# Patient Record
Sex: MALE | Race: WHITE
[De-identification: names, ages, dates, MRNs, and addresses within clinical notes are randomized per-mention and may not be internally consistent; named-entity substitution may affect disease eponyms.]

---

## 2017-01-12 ENCOUNTER — TRANSCRIPTION ENCOUNTER (OUTPATIENT)
Age: 32
End: 2017-01-12

## 2017-07-09 ENCOUNTER — EMERGENCY (EMERGENCY)
Facility: HOSPITAL | Age: 32
LOS: 0 days | Discharge: HOME | End: 2017-07-09
Attending: EMERGENCY MEDICINE | Admitting: EMERGENCY MEDICINE
Payer: SELF-PAY

## 2017-07-09 VITALS — WEIGHT: 203.05 LBS | HEIGHT: 66 IN

## 2017-07-09 VITALS
HEART RATE: 69 BPM | RESPIRATION RATE: 20 BRPM | OXYGEN SATURATION: 98 % | DIASTOLIC BLOOD PRESSURE: 67 MMHG | SYSTOLIC BLOOD PRESSURE: 132 MMHG

## 2017-07-09 DIAGNOSIS — M51.26 OTHER INTERVERTEBRAL DISC DISPLACEMENT, LUMBAR REGION: ICD-10-CM

## 2017-07-09 DIAGNOSIS — M54.9 DORSALGIA, UNSPECIFIED: ICD-10-CM

## 2017-07-09 DIAGNOSIS — M51.26 OTHER INTERVERTEBRAL DISC DISPLACEMENT, LUMBAR REGION: Chronic | ICD-10-CM

## 2017-07-09 DIAGNOSIS — M47.27 OTHER SPONDYLOSIS WITH RADICULOPATHY, LUMBOSACRAL REGION: ICD-10-CM

## 2017-07-09 DIAGNOSIS — G89.29 OTHER CHRONIC PAIN: ICD-10-CM

## 2017-07-09 PROCEDURE — 72100 X-RAY EXAM L-S SPINE 2/3 VWS: CPT | Mod: 26

## 2017-07-09 PROCEDURE — 99053 MED SERV 10PM-8AM 24 HR FAC: CPT

## 2017-07-09 PROCEDURE — 99284 EMERGENCY DEPT VISIT MOD MDM: CPT

## 2017-07-09 RX ORDER — OXYCODONE AND ACETAMINOPHEN 5; 325 MG/1; MG/1
2 TABLET ORAL ONCE
Qty: 0 | Refills: 0 | Status: DISCONTINUED | OUTPATIENT
Start: 2017-07-09 | End: 2017-07-09

## 2017-07-09 RX ORDER — DIAZEPAM 5 MG
1 TABLET ORAL
Qty: 8 | Refills: 0 | OUTPATIENT
Start: 2017-07-09

## 2017-07-09 RX ORDER — MORPHINE SULFATE 50 MG/1
6 CAPSULE, EXTENDED RELEASE ORAL ONCE
Qty: 0 | Refills: 0 | Status: DISCONTINUED | OUTPATIENT
Start: 2017-07-09 | End: 2017-07-09

## 2017-07-09 RX ORDER — KETOROLAC TROMETHAMINE 30 MG/ML
30 SYRINGE (ML) INJECTION ONCE
Qty: 0 | Refills: 0 | Status: DISCONTINUED | OUTPATIENT
Start: 2017-07-09 | End: 2017-07-09

## 2017-07-09 RX ADMIN — Medication 125 MILLIGRAM(S): at 21:49

## 2017-07-09 RX ADMIN — OXYCODONE AND ACETAMINOPHEN 2 TABLET(S): 5; 325 TABLET ORAL at 22:07

## 2017-07-09 RX ADMIN — Medication 30 MILLIGRAM(S): at 21:49

## 2017-07-09 NOTE — ED STATDOCS - OBJECTIVE STATEMENT
33 y/o M with HX of herniated lumbar disk  presents to ED for evaluation of back pain. Pt states he has chronic back pain that was worsened when he stood up and felt a pop followed by pain. Pt states he is being followed by an orthopedist who is currently recommending surgery. Pt has taken ibuprofen PTA. PCP is at [facility]. No incontinence

## 2017-07-09 NOTE — ED ADULT NURSE NOTE - OBJECTIVE STATEMENT
pt is 31 yo male presents to er for chronic back pain, hx of herniated disc, back surgery, knee surgery getting worse since fourth of July.

## 2017-07-09 NOTE — ED ADULT NURSE NOTE - CHPI ED SYMPTOMS NEG
no anorexia/no neck tenderness/no difficulty bearing weight/no fatigue/no bladder dysfunction/no motor function loss

## 2017-07-09 NOTE — ED ADULT TRIAGE NOTE - CHIEF COMPLAINT QUOTE
chronic back pain. Reports "stood up" on 7/4 and "felt pop. Has not been seen. has not tried any OTC meds. Reports "chronic back issues."

## 2017-07-09 NOTE — ED STATDOCS - PROGRESS NOTE DETAILS
JG:  Pt. brought computer disk copy of MRI from 5/5/17 without associated report. Patient states his foot drop has been going on since Sept. 2016 and is a workers comp case so he is frustrated because the earliest MRI he was able to get was in May 2017.  Pt. to see Dr. Landaverde from [facility] on 7/21.  Will manage patients acute pain issues with ibuprofen, medrol dosepak, percocet and valium at this time. Pt. feeling better and wants to go home.  Will rest for the next 2-3 days and see Dr. Landaverde for outpatient management.

## 2017-07-09 NOTE — ED STATDOCS - NEUROLOGICAL, MLM
sensation is normal and strength is normal. sensation is normal and strength is normal except chronic left foot drop with 4/5 strength with left foot dorsiflexion

## 2018-01-14 ENCOUNTER — TRANSCRIPTION ENCOUNTER (OUTPATIENT)
Age: 33
End: 2018-01-14

## 2018-04-30 ENCOUNTER — EMERGENCY (EMERGENCY)
Facility: HOSPITAL | Age: 33
LOS: 0 days | Discharge: HOME | End: 2018-04-30
Attending: EMERGENCY MEDICINE | Admitting: EMERGENCY MEDICINE
Payer: OTHER MISCELLANEOUS

## 2018-04-30 VITALS
OXYGEN SATURATION: 99 % | DIASTOLIC BLOOD PRESSURE: 89 MMHG | HEART RATE: 82 BPM | SYSTOLIC BLOOD PRESSURE: 162 MMHG | RESPIRATION RATE: 18 BRPM | TEMPERATURE: 98 F

## 2018-04-30 VITALS — WEIGHT: 203.93 LBS
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
## 2018-04-30 DIAGNOSIS — M54.5 LOW BACK PAIN: ICD-10-CM
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
## 2018-04-30 DIAGNOSIS — S93.401A SPRAIN OF UNSPECIFIED LIGAMENT OF RIGHT ANKLE, INITIAL ENCOUNTER: ICD-10-CM

## 2018-04-30 DIAGNOSIS — M51.26 OTHER INTERVERTEBRAL DISC DISPLACEMENT, LUMBAR REGION: Chronic | ICD-10-CM

## 2018-04-30 DIAGNOSIS — S89.81XA OTHER SPECIFIED INJURIES OF RIGHT LOWER LEG, INITIAL ENCOUNTER: ICD-10-CM

## 2018-04-30 PROCEDURE — 72100 X-RAY EXAM L-S SPINE 2/3 VWS: CPT | Mod: 26

## 2018-04-30 PROCEDURE — 29515 APPLICATION SHORT LEG SPLINT: CPT | Mod: RT

## 2018-04-30 PROCEDURE — 99284 EMERGENCY DEPT VISIT MOD MDM: CPT

## 2018-04-30 PROCEDURE — 73610 X-RAY EXAM OF ANKLE: CPT | Mod: 26,RT

## 2018-04-30 NOTE — ED ADULT TRIAGE NOTE - CHIEF COMPLAINT QUOTE
Pt states he was at work and ahd mechnical trip and fall, falling onto back  and left ankle. pt denies hitting head, no LOC, no anticoagulation therapy,. pt c/o back pain and right ankle pain (depsite landing on left)

## 2018-04-30 NOTE — ED STATDOCS - MUSCULOSKELETAL, MLM
+tenderness in L-S spine with palpation, both midline and laterally. Negative SLR b/l. Right ankle pain with flexion, no bony tenderness.

## 2018-04-30 NOTE — ED PROCEDURE NOTE - CPROC ED POST PROC CARE GUIDE1
Instructed patient/caregiver to follow-up with primary care physician./Elevate the injured extremity as instructed./Keep the cast/splint/dressing clean and dry./Verbal/written post procedure instructions were given to patient/caregiver./Instructed patient/caregiver regarding signs and symptoms of infection.

## 2018-04-30 NOTE — ED STATDOCS - PROGRESS NOTE DETAILS
Patient seen and evaluated.  No acute findings on xrays.  R ankle likely sprained, placed in ace wrap and air cast, RICE reviewed and orthopedic f/u recommended.  Return precautions for back injury reviewed, patient will follow up with his spine specialist -Sean Reyez PA-C

## 2018-04-30 NOTE — ED STATDOCS - CARE PLAN
Principal Discharge DX:	Fall, initial encounter  Secondary Diagnosis:	Ankle sprain  Secondary Diagnosis:	Low back pain

## 2018-04-30 NOTE — ED STATDOCS - OBJECTIVE STATEMENT
31 y/o male with a PMHx of chronic back pain s/p surgery presents to the ED s/p mechanical fall 30 minutes ago. Pt states he was at work and slipped and fell on his back and right ankle. Pt now c/o lower back pain, right ankle pain. No fever or any other acute complaints at this time. NKDA. Takes Ibuprofen.

## 2020-02-05 NOTE — ED STATDOCS - CONSTITUTIONAL NEGATIVE STATEMENT, MLM
For redness to the upper and lower eyelids:    Hydrocortisone 1% cream:  Apply a thin layer to affected skin 3 times daily for 7 days. If the rash improves but then returns shortly after stopping the Hydrocortisone cream, it would suggest allergy to dog may be the reason for the redness. If the redness doesn't improve at all, then a referral to Pediatric Dermatology is recommended. ..... Pediatric Dermatologists:    Dr. Christelle Turner. [facility] [address]  [phone] (Dermatology Clinic)     Dr. Lea Jasso, [address] [phone]    Dr. Caroline Cuevas [facility] on Tuesdays and Thursday, [facility], [address] [facility] [phone] [facility], [address] [place] [address] [address] [phone] on Fridays.
no fever and no chills.

## 2020-10-23 NOTE — ED ADULT NURSE NOTE - NS ED PATIENT SAFETY CONCERN
Health Maintenance Due   Topic Date Due   • Shingles Vaccine (2 of 3) 11/09/2011   • Medicare Wellness Visit  10/22/2020     Here for MWV-sub  Patient is due for topics as listed above but is not proceeding with Immunization(s) Shingles at this time.   Due for Shingrix  Due for MWV    Recent PHQ 2/9 Score    PHQ 2:  Date Adult PHQ 2 Score Adult PHQ 2 Interpretation   10/23/2020 0 No further screening needed       PHQ 9:        3.) During the past 4 weeks, how would you rate your health?: Fair     6 a.) How many servings of Fruits and Vegetables do you have each day ( 1 serving = 1 piece of fruit, 1/2 cup fruits or vegetables): 1 per day     8.) During the past 4 weeks, has your physical and emotional health limited your social activities with family, friends, neighbors, or other groups?: Quite a bit     11a.) Bladder Control problems (urine leaking): Often     11b.) Bowel control problems: Often     11f.) Feeling stressed or overwhelmed: Often     11j.) Problems with your balance: Often     13.) Do you need help with any of the following activities?: Go shopping for groceries or clothes, Do your housework or laundry, Prepare a meal, Handle your own money     15.) How confident are you that you can control and manage most of your health problems?: Not very confident          No

## 2021-06-03 ENCOUNTER — EMERGENCY (EMERGENCY)
Facility: HOSPITAL | Age: 36
LOS: 0 days | Discharge: HOME | End: 2021-06-03
Attending: EMERGENCY MEDICINE
Payer: COMMERCIAL

## 2021-06-03 VITALS
SYSTOLIC BLOOD PRESSURE: 136 MMHG | DIASTOLIC BLOOD PRESSURE: 84 MMHG | TEMPERATURE: 98 F | RESPIRATION RATE: 18 BRPM | HEART RATE: 74 BPM | OXYGEN SATURATION: 98 %

## 2021-06-03 VITALS — HEIGHT: 66 IN | WEIGHT: 199.96 LBS

## 2021-06-03 DIAGNOSIS — G89.29 OTHER CHRONIC PAIN: ICD-10-CM

## 2021-06-03 DIAGNOSIS — M51.26 OTHER INTERVERTEBRAL DISC DISPLACEMENT, LUMBAR REGION: Chronic | ICD-10-CM

## 2021-06-03 DIAGNOSIS — M54.9 DORSALGIA, UNSPECIFIED: ICD-10-CM

## 2021-06-03 DIAGNOSIS — M25.521 PAIN IN RIGHT ELBOW: ICD-10-CM

## 2021-06-03 DIAGNOSIS — M77.11 LATERAL EPICONDYLITIS, RIGHT ELBOW: ICD-10-CM
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
## 2021-06-03 PROCEDURE — 73080 X-RAY EXAM OF ELBOW: CPT | Mod: RT

## 2021-06-03 PROCEDURE — 99283 EMERGENCY DEPT VISIT LOW MDM: CPT | Mod: 25

## 2021-06-03 PROCEDURE — 73080 X-RAY EXAM OF ELBOW: CPT | Mod: 26,RT

## 2021-06-03 PROCEDURE — 99284 EMERGENCY DEPT VISIT MOD MDM: CPT

## 2021-06-03 NOTE — ED ADULT NURSE NOTE - NS ED PATIENT SAFETY CONCERN
Immediate Operative Summary


Operative Date


Jan 18, 2017.





Pre-Operative Diagnosis





Left eye cataract





Post-Operative Diagnosis





Same as preop





Procedure(s) Performed





Left Cataract Phacoemulsification With Intraocular Lens Implant; Restor


Lens


Femto Laser





Surgeon


Dr. Mathis





Assistant Surgeon(s)


None





Estimated Blood Loss


0 mL





Findings





Nuclear cataract left eye





Fluids (cc crystalloids)


300 ml





Specimens





None





Drains


none





Anesthesia


L/S





Complication(s)


None





Disposition


Recovery Room / PACU No

## 2021-06-03 NOTE — ED STATDOCS - PROGRESS NOTE DETAILS
36 yr. old male presents to ED with right elbow pain for the past month. 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 No fever or chills. No apparent injury. Seen and examined by attending in intake. Plan: X-ray of elbow and forearm. Pain control. Will F/U with results and re evaluate. Francisco J NGUYEN Right Elbow and Right forearm X-rays no fracture. Francisco J NP Results of X-rays reviewed with patient. Agreed to take Ibuprofen 600 mg. PO every 6 hrs. with food and F/u with Orthopedic. Francisco J NGUYEN

## 2021-06-03 NOTE — ED STATDOCS - CARE PROVIDER_API CALL
Marium Mars)  Orthopaedic Surgery; Surgery of the Hand  [address] [place] [address]  Phone: [phone]  Fax: [phone]  Follow Up Time:

## 2021-06-03 NOTE — ED STATDOCS - NSFOLLOWUPINSTRUCTIONS_ED_ALL_ED_FT
Tennis Elbow    WHAT YOU NEED TO KNOW:    Tennis elbow is inflammation of the tendons in your elbow. Tendons are strong tissues that connect muscle to bone.     DISCHARGE INSTRUCTIONS:    Return to the emergency department if:   •You suddenly have no feeling in your arm, hand, or fingers.      •You suddenly cannot move your arm, wrist, hand, or fingers.      Contact your healthcare provider if:   •You have a fever.      •You have more pain or weakness in your arm, wrist, hand, or fingers.      •You have new numbness or tingling in your arm, hand, or fingers.      •You have questions or concerns about your condition or care.      Medicines:   •Acetaminophen decreases pain and fever. It is available without a doctor's order. Ask how much to take and how often to take it. Follow directions. Read the labels of all other medicines you are using to see if they also contain acetaminophen, or ask your doctor or pharmacist. Acetaminophen can cause liver damage if not taken correctly. Do not use more than 4 grams (4,000 milligrams) total of acetaminophen in one day.       •NSAIDs, such as ibuprofen, help decrease swelling, pain, and fever. This medicine is available with or without a doctor's order. NSAIDs can cause stomach bleeding or kidney problems in certain people. If you take blood thinner medicine, always ask your healthcare provider if NSAIDs are safe for you. Always read the medicine label and follow directions.      •Take your medicine as directed. Contact your healthcare provider if you think your medicine is not helping or if you have side effects. Tell him or her if you are allergic to any medicine. Keep a list of the medicines, vitamins, and herbs you take. Include the amounts, and when and why you take them. Bring the list or the pill bottles to follow-up visits. Carry your medicine list with you in case of an emergency.      Physical therapy: A physical therapist teaches you exercises to help improve movement and strength, and to decrease pain.     Self-care:   •Wear your support device as directed. Devices, such as an arm strap, brace, or splint, help limit your arm movement. They also decrease pain and help prevent more damage to your tendon. Ask your healthcare provider how to care for your arm while you wear a brace or splint.      •Rest your injured arm and avoid activities that cause pain. This will help your tendons heal.      •Apply ice on your elbow for 15 to 20 minutes every hour or as directed. Use an ice pack, or put crushed ice in a plastic bag. Cover it with a towel before you apply it to your skin. Ice helps prevent tissue damage and decreases swelling and pain.      •Elevate your elbow above the level of your heart as often as you can. This will help decrease swelling and pain. Prop your elbow on pillows or blankets to keep it elevated comfortably.       Follow up with your healthcare provider as directed: Write down your questions so you remember to ask them during your visits.

## 2021-06-03 NOTE — ED STATDOCS - OBJECTIVE STATEMENT
35 y/o M with PMHx of chronic back pain presents ambulatory to the ED c/o +R elbow pain x1 month s/p changing tires at work. Has been icing, taking OTC meds, and compression with some improvement but still with pain. No fever or other injuries. NKDA.

## 2021-06-03 NOTE — ED ADULT NURSE NOTE - NSIMPLEMENTINTERV_GEN_ALL_ED
Implemented All Universal Safety Interventions:  [place] to call system. Call bell, personal items and telephone within reach. Instruct patient to call for assistance. Room bathroom lighting operational. Non-slip footwear when patient is off stretcher. Physically safe environment: no spills, clutter or unnecessary equipment. Stretcher in lowest position, wheels locked, appropriate side rails in place.

## 2021-06-03 NOTE — ED STATDOCS - PATIENT PORTAL LINK FT
You can access the FollowMyHealth Patient Portal offered by [facility] by registering at the following website: [email or web address]. By joining [employer]’s FollowMyHealth portal, you will also be able to view your health information using other applications (apps) compatible with our system.

## 2021-06-04 PROBLEM — M51.26 OTHER INTERVERTEBRAL DISC DISPLACEMENT, LUMBAR REGION: Chronic | Status: ACTIVE | Noted: 2017-07-09

## 2021-06-04 PROBLEM — M54.9 DORSALGIA, UNSPECIFIED: Chronic | Status: ACTIVE | Noted: 2017-07-09

## 2022-09-11 ENCOUNTER — NON-APPOINTMENT (OUTPATIENT)
Age: 37
End: 2022-09-11

## 2023-07-18 NOTE — ED ADULT NURSE NOTE - RESPIRATORY WDL
[Initial] : an initial consultation for
Breathing spontaneous and unlabored. Breath sounds clear and equal bilaterally with regular rhythm.

## 2024-07-29 ENCOUNTER — EMERGENCY (EMERGENCY)
Facility: HOSPITAL | Age: 39
LOS: 0 days | Discharge: HOME | End: 2024-07-29
Attending: STUDENT IN AN ORGANIZED HEALTH CARE EDUCATION/TRAINING PROGRAM
Payer: COMMERCIAL

## 2024-07-29 VITALS
HEART RATE: 74 BPM | SYSTOLIC BLOOD PRESSURE: 136 MMHG | TEMPERATURE: 99 F | OXYGEN SATURATION: 98 % | RESPIRATION RATE: 16 BRPM | DIASTOLIC BLOOD PRESSURE: 99 MMHG

## 2024-07-29 VITALS — WEIGHT: 211.2 LBS

## 2024-07-29 DIAGNOSIS — K08.89 OTHER SPECIFIED DISORDERS OF TEETH AND SUPPORTING STRUCTURES: ICD-10-CM

## 2024-07-29 DIAGNOSIS — K04.7 PERIAPICAL ABSCESS WITHOUT SINUS: ICD-10-CM

## 2024-07-29 DIAGNOSIS — M51.26 OTHER INTERVERTEBRAL DISC DISPLACEMENT, LUMBAR REGION: Chronic | ICD-10-CM

## 2024-07-29 LAB
ALBUMIN SERPL ELPH-MCNC: 3.7 G/DL — SIGNIFICANT CHANGE UP (ref 3.3–5)
ALP SERPL-CCNC: 121 U/L — HIGH (ref 40–120)
ALT FLD-CCNC: 20 U/L — SIGNIFICANT CHANGE UP (ref 12–78)
ANION GAP SERPL CALC-SCNC: 4 MMOL/L — LOW (ref 5–17)
AST SERPL-CCNC: 18 U/L — SIGNIFICANT CHANGE UP (ref 15–37)
BASOPHILS # BLD AUTO: 0.07 K/UL — SIGNIFICANT CHANGE UP (ref 0–0.2)
BASOPHILS NFR BLD AUTO: 0.6 % — SIGNIFICANT CHANGE UP (ref 0–2)
BILIRUB SERPL-MCNC: 0.5 MG/DL — SIGNIFICANT CHANGE UP (ref 0.2–1.2)
BUN SERPL-MCNC: 11 MG/DL — SIGNIFICANT CHANGE UP (ref 7–23)
CALCIUM SERPL-MCNC: 9.2 MG/DL — SIGNIFICANT CHANGE UP (ref 8.5–10.1)
CHLORIDE SERPL-SCNC: 107 MMOL/L — SIGNIFICANT CHANGE UP (ref 96–108)
CO2 SERPL-SCNC: 25 MMOL/L — SIGNIFICANT CHANGE UP (ref 22–31)
CREAT SERPL-MCNC: 0.79 MG/DL — SIGNIFICANT CHANGE UP (ref 0.5–1.3)
EGFR: 117 ML/MIN/1.73M2 — SIGNIFICANT CHANGE UP
EOSINOPHIL # BLD AUTO: 0.08 K/UL — SIGNIFICANT CHANGE UP (ref 0–0.5)
EOSINOPHIL NFR BLD AUTO: 0.6 % — SIGNIFICANT CHANGE UP (ref 0–6)
GLUCOSE SERPL-MCNC: 128 MG/DL — HIGH (ref 70–99)
HCT VFR BLD CALC: 39.8 % — SIGNIFICANT CHANGE UP (ref 39–50)
HGB BLD-MCNC: 13.3 G/DL — SIGNIFICANT CHANGE UP (ref 13–17)
IMM GRANULOCYTES NFR BLD AUTO: 0.5 % — SIGNIFICANT CHANGE UP (ref 0–0.9)
LACTATE SERPL-SCNC: 0.8 MMOL/L — SIGNIFICANT CHANGE UP (ref 0.7–2)
LIDOCAIN IGE QN: 14 U/L — SIGNIFICANT CHANGE UP (ref 13–75)
LYMPHOCYTES # BLD AUTO: 1.58 K/UL — SIGNIFICANT CHANGE UP (ref 1–3.3)
LYMPHOCYTES # BLD AUTO: 12.4 % — LOW (ref 13–44)
MCHC RBC-ENTMCNC: 30.5 PG — SIGNIFICANT CHANGE UP (ref 27–34)
MCHC RBC-ENTMCNC: 33.4 GM/DL — SIGNIFICANT CHANGE UP (ref 32–36)
MCV RBC AUTO: 91.3 FL — SIGNIFICANT CHANGE UP (ref 80–100)
MONOCYTES # BLD AUTO: 0.76 K/UL — SIGNIFICANT CHANGE UP (ref 0–0.9)
MONOCYTES NFR BLD AUTO: 6 % — SIGNIFICANT CHANGE UP (ref 2–14)
NEUTROPHILS # BLD AUTO: 10.17 K/UL — HIGH (ref 1.8–7.4)
NEUTROPHILS NFR BLD AUTO: 79.9 % — HIGH (ref 43–77)
PLATELET # BLD AUTO: 166 K/UL — SIGNIFICANT CHANGE UP (ref 150–400)
POTASSIUM SERPL-MCNC: 4 MMOL/L — SIGNIFICANT CHANGE UP (ref 3.5–5.3)
POTASSIUM SERPL-SCNC: 4 MMOL/L — SIGNIFICANT CHANGE UP (ref 3.5–5.3)
PROT SERPL-MCNC: 7.6 GM/DL — SIGNIFICANT CHANGE UP (ref 6–8.3)
RBC # BLD: 4.36 M/UL — SIGNIFICANT CHANGE UP (ref 4.2–5.8)
RBC # FLD: 12.2 % — SIGNIFICANT CHANGE UP (ref 10.3–14.5)
SODIUM SERPL-SCNC: 136 MMOL/L — SIGNIFICANT CHANGE UP (ref 135–145)
WBC # BLD: 12.72 K/UL — HIGH (ref 3.8–10.5)
WBC # FLD AUTO: 12.72 K/UL — HIGH (ref 3.8–10.5)

## 2024-07-29 PROCEDURE — 99285 EMERGENCY DEPT VISIT HI MDM: CPT

## 2024-07-29 PROCEDURE — 99284 EMERGENCY DEPT VISIT MOD MDM: CPT | Mod: 25

## 2024-07-29 PROCEDURE — 36415 COLL VENOUS BLD VENIPUNCTURE: CPT

## 2024-07-29 PROCEDURE — 96375 TX/PRO/DX INJ NEW DRUG ADDON: CPT | Mod: XU

## 2024-07-29 PROCEDURE — 70487 CT MAXILLOFACIAL W/DYE: CPT | Mod: 26,MC

## 2024-07-29 PROCEDURE — 87040 BLOOD CULTURE FOR BACTERIA: CPT

## 2024-07-29 PROCEDURE — 80053 COMPREHEN METABOLIC PANEL: CPT

## 2024-07-29 PROCEDURE — 83605 ASSAY OF LACTIC ACID: CPT

## 2024-07-29 PROCEDURE — 96374 THER/PROPH/DIAG INJ IV PUSH: CPT | Mod: XU

## 2024-07-29 PROCEDURE — 85025 COMPLETE CBC W/AUTO DIFF WBC: CPT

## 2024-07-29 PROCEDURE — 83690 ASSAY OF LIPASE: CPT

## 2024-07-29 PROCEDURE — 70487 CT MAXILLOFACIAL W/DYE: CPT | Mod: MC

## 2024-07-29 RX ORDER — SODIUM CHLORIDE 0.9 % (FLUSH) 0.9 %
1000 SYRINGE (ML) INJECTION ONCE
Refills: 0 | Status: COMPLETED | OUTPATIENT
Start: 2024-07-29 | End: 2024-07-29

## 2024-07-29 RX ORDER — KETOROLAC TROMETHAMINE 30 MG/ML
15 INJECTION, SOLUTION INTRAMUSCULAR ONCE
Refills: 0 | Status: DISCONTINUED | OUTPATIENT
Start: 2024-07-29 | End: 2024-07-29

## 2024-07-29 RX ORDER — AMOXICILLIN/POTASSIUM CLAV 250-125 MG
875 TABLET ORAL
Qty: 28 | Refills: 0
Start: 2024-07-29 | End: 2024-08-11

## 2024-07-29 RX ORDER — PIPERACILLIN SODIUM AND TAZOBACTAM SODIUM 3; .375 G/15ML; G/15ML
3.38 INJECTION, POWDER, LYOPHILIZED, FOR SOLUTION INTRAVENOUS ONCE
Refills: 0 | Status: COMPLETED | OUTPATIENT
Start: 2024-07-29 | End: 2024-07-29

## 2024-07-29 RX ORDER — OXYCODONE HYDROCHLORIDE 100 MG/5ML
1 SOLUTION ORAL
Qty: 9 | Refills: 0
Start: 2024-07-29 | End: 2024-07-31

## 2024-07-29 RX ORDER — ACETAMINOPHEN 325 MG
2 TABLET ORAL
Qty: 112 | Refills: 0
Start: 2024-07-29 | End: 2024-08-11

## 2024-07-29 RX ADMIN — KETOROLAC TROMETHAMINE 15 MILLIGRAM(S): 30 INJECTION, SOLUTION INTRAMUSCULAR at 10:19

## 2024-07-29 RX ADMIN — Medication 1000 MILLILITER(S): at 08:09

## 2024-07-29 RX ADMIN — Medication 1000 MILLILITER(S): at 10:20

## 2024-07-29 RX ADMIN — PIPERACILLIN SODIUM AND TAZOBACTAM SODIUM 200 GRAM(S): 3; .375 INJECTION, POWDER, LYOPHILIZED, FOR SOLUTION INTRAVENOUS at 10:36

## 2024-07-29 NOTE — ED PROVIDER NOTE - NSFOLLOWUPCLINICS_GEN_ALL_ED_FT
Oral & Maxillofacial Surgery  Department of Dental Medicine  [address] [place] [address]  Phone: [phone]  Fax: [phone]

## 2024-07-29 NOTE — ED PROVIDER NOTE - PATIENT PORTAL LINK FT
You can access the FollowMyHealth Patient Portal offered by [facility] by registering at the following website: [email or web address]. By joining [employer]’s FollowMyHealth portal, you will also be able to view your health information using other applications (apps) compatible with our system.

## 2024-07-29 NOTE — ED PROVIDER NOTE - PROGRESS NOTE DETAILS
Daniele Attending Physician:  CT maxillofacial showed periapical abscess in the left maxillary first molar with associated buccal cortical dehiscence and adjacent subperiosteal abscess. Consulted Dr. Pollo REDDY and recommends that patient can either follow up outpatient at the [facility] dental clinic or be transferred to [facility] ED for dental evaluation. Discussed with patient and wife at bedside and patient would like to go home now and will follow up with [facility] dental clinic on his own. Return precautions reviewed and provided information for [facility] dental clinic.

## 2024-07-29 NOTE — ED PROVIDER NOTE - OBJECTIVE STATEMENT
Patient is a 39 year-old-male with no reported PMH/PSH presents with 3-day history of L dental pain. Accompanied by significant other. Reports that he started having pain in the L upper teeth and noticed swelling in the left face. Denies fever at home. Reports that he took ibuprofen and oxycodone at home with some relief this morning. Does not follow with a dentist.

## 2024-07-29 NOTE — ED ADULT TRIAGE NOTE - CHIEF COMPLAINT QUOTE
pt ambulatory to triage c/o left facial swelling/pain since Friday, increasing jaw pain since Friday, swelling extends from orbital to jaw line, no SOB.

## 2024-07-29 NOTE — ED PROVIDER NOTE - PHYSICAL EXAMINATION
Vitals: I have reviewed the patients vital signs  General: not in acute distress   HEENT: Atraumatic, normocephalic, airway patent, L facial swelling with tenderness noted on palpation, poor dental hygiene, tenderness/fluctuance noted in the gum area surrounding teeth # 14-16   Eyes: EOMI, tracking appropriately  Neck: no tracheal deviation, no JVD  Chest/Lungs: symmetric chest rise, speaking in complete sentences, no WOB  Heart: skin and extremities well perfused, borderline tachycardia   Abdomen: soft, nontender and nondistended   Neuro: A+Ox3, ambulating without difficulty, CN grossly intact  MSK: strength at baseline in all extremities, no muscle wasting or atrophy  Skin: no cyanosis, no jaundice, no new emergent lesions

## 2024-07-29 NOTE — ED ADULT NURSE NOTE - OBJECTIVE STATEMENT
pt c/o dental pain. pt states he woke up to go to work this morning and left side of face was swollen and painful. No recent dental procedures, left side of face swollen from jaw to cheek. pt took 2 amoxicillin and 30mg oxy PTA. Pt denies fevers, n/v/d, CP, SOB, HA/dizziness, blurry vision at this time

## 2024-07-29 NOTE — ED PROVIDER NOTE - NS ED MD DISPO DISCHARGE CCDA
WFL except left knee ~ 5-75 degrees. BUEs and BLEs grossly 3+/5 within available range. Patient/Caregiver provided printed discharge information.

## 2024-07-29 NOTE — ED PROVIDER NOTE - NSFOLLOWUPINSTRUCTIONS_ED_ALL_ED_FT
You were seen in the emergency department for left dental pain, found to have abscess. You can find the results of all the tests in this discharge packet.     Please follow up with your primary care doctor within 48 hours for continuation of care. Please follow up with [facility] dental clinic for further management of left dental abscess.     Return to the emergency department if you experience any new/concerning/worsening symptoms such as but not limited to: fever (>100.3F), intractable nausea, vomiting, chest pain, shortness of breath, abdominal pain, worsening pain not controlled with medication at home.     For pain, you may take:  1) Acetaminophen (Tylenol): 500mg every 6 hours as needed for pain   2) Ibuprofen (Advil/Motrin): 400mg every 6 hours as needed for pain   3) Oxycodone: 5mg every 8 hours as needed for severe pain *Do not drive or operate heavy machinery while taking this medication*  4) Augmentin: 1 tablet every 12 hours for 14 days You were seen in the emergency department for left dental pain, found to have abscess. You can find the results of all the tests in this discharge packet.     Please follow up with your primary care doctor within 48 hours for continuation of care. Please follow up with [facility] dental clinic for further management of left dental abscess.     Return to the emergency department if you experience any new/concerning/worsening symptoms such as but not limited to: fever (>100.3F), intractable nausea, vomiting, chest pain, shortness of breath, abdominal pain, worsening pain not controlled with medication at home.     You are prescribed:  1) Acetaminophen (Tylenol): 500mg every 6 hours as needed for pain   2) Ibuprofen (Advil/Motrin): 400mg every 6 hours as needed for pain   3) Oxycodone: 5mg every 8 hours as needed for severe pain *Do not drive or operate heavy machinery while taking this medication*  4) Augmentin: 1 tablet every 12 hours for 14 days

## 2024-07-29 NOTE — ED PROVIDER NOTE - CLINICAL SUMMARY MEDICAL DECISION MAKING FREE TEXT BOX
Patient is a 39 year-old-male with no reported PMH/PSH presents with 3-day history of L dental pain. Accompanied by significant other. Reports that he started having pain in the L upper teeth and noticed swelling in the left face. Denies fever at home. Reports that he took ibuprofen and oxycodone at home with some relief this morning. Does not follow with a dentist. Exam concerning for dental infection vs abscess. Workup includes labs and CT maxillofacial to evaluate for abscess. Will give IV hydration and reassess. Patient is declining pain medication at this time.

## 2024-07-29 NOTE — ED ADULT NURSE REASSESSMENT NOTE - NS ED NURSE REASSESS COMMENT FT1
received report from SVEN Collier.  patient resting in stretcher in lowest locked position.  IV placed, laws drawn.  transported to [place] at 08:00.  pending results, patient and visitor aware

## 2024-07-29 NOTE — ED ADULT NURSE NOTE - NSFALLUNIVINTERV_ED_ALL_ED
Bed/Stretcher in lowest position, wheels locked, appropriate side rails in place/Call bell, personal items and telephone in reach/Instruct patient to call for assistance before getting out of bed/chair/stretcher/Non-slip footwear applied when patient is off stretcher/[place] to call system/Physically safe environment - no spills, clutter or unnecessary equipment/Purposeful proactive rounding/Room/bathroom lighting operational, light cord in reach

## 2024-08-03 LAB
CULTURE RESULTS: SIGNIFICANT CHANGE UP
CULTURE RESULTS: SIGNIFICANT CHANGE UP
SPECIMEN SOURCE: SIGNIFICANT CHANGE UP
SPECIMEN SOURCE: SIGNIFICANT CHANGE UP

## 2025-05-09 NOTE — ED ADULT NURSE NOTE - NSSISCREENINGQ2_ED_A_ED
Does get frequent postnasal drainage and does have some intermittent nasal congestion.  I also told him he could try over-the-counter Claritin to help      Orders:    fluticasone (FLONASE) 50 mcg/act nasal spray; Take 2 sprays at bedtime as needed for nasal congestion and postnasal drip     No